# Patient Record
Sex: FEMALE | NOT HISPANIC OR LATINO | Employment: FULL TIME | URBAN - METROPOLITAN AREA
[De-identification: names, ages, dates, MRNs, and addresses within clinical notes are randomized per-mention and may not be internally consistent; named-entity substitution may affect disease eponyms.]

---

## 2021-04-02 ENCOUNTER — BULK ORDERING (OUTPATIENT)
Dept: CASE MANAGEMENT | Facility: OTHER | Age: 46
End: 2021-04-02

## 2021-04-02 DIAGNOSIS — Z23 IMMUNIZATION DUE: ICD-10-CM

## 2021-07-22 ENCOUNTER — LAB (OUTPATIENT)
Dept: LAB | Facility: HOSPITAL | Age: 46
End: 2021-07-22

## 2021-07-22 ENCOUNTER — OFFICE VISIT (OUTPATIENT)
Dept: INTERNAL MEDICINE | Facility: CLINIC | Age: 46
End: 2021-07-22

## 2021-07-22 VITALS
TEMPERATURE: 97.1 F | WEIGHT: 250 LBS | DIASTOLIC BLOOD PRESSURE: 88 MMHG | RESPIRATION RATE: 18 BRPM | HEART RATE: 78 BPM | SYSTOLIC BLOOD PRESSURE: 132 MMHG | OXYGEN SATURATION: 98 % | HEIGHT: 63 IN | BODY MASS INDEX: 44.3 KG/M2

## 2021-07-22 DIAGNOSIS — R06.83 SNORING: ICD-10-CM

## 2021-07-22 DIAGNOSIS — Z87.09 HISTORY OF ASTHMA: ICD-10-CM

## 2021-07-22 DIAGNOSIS — E66.01 MORBID (SEVERE) OBESITY DUE TO EXCESS CALORIES (HCC): Primary | ICD-10-CM

## 2021-07-22 DIAGNOSIS — Z11.59 ENCOUNTER FOR HEPATITIS C SCREENING TEST FOR LOW RISK PATIENT: ICD-10-CM

## 2021-07-22 PROCEDURE — 99203 OFFICE O/P NEW LOW 30 MIN: CPT | Performed by: NURSE PRACTITIONER

## 2021-07-22 NOTE — PROGRESS NOTES
Subjective   Chief Complaint   Patient presents with   • Sleep Apnea   • Obesity       Lilia Pritchett is a 46 y.o. female here today as a new patient to establish care.  She complains of obesity.  She states she's been overweight for about 10 years.  She has not had a PCP for about 3 years.  She has tried cutting out sweats, she stopped eating red meat about a month ago.  She does not exercise.   Pt is needing a referral to be tested for sleep apnea.  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  She states that her wife tells her she snores from time to time.    She reports no medical history  She doesn't take any Rx meds.  Only takes Benadryl at night to sleep.    Had cataract surgery left eye earlier this month.    History of asthma.  Has rescue inhaler for prn use.      Review of Systems   Constitutional: Positive for unexpected weight gain. Negative for activity change, appetite change and fatigue.   HENT: Negative for congestion.    Respiratory: Negative for cough and shortness of breath.    Cardiovascular: Negative for chest pain and leg swelling.   Gastrointestinal: Negative for abdominal pain.   Neurological: Negative for dizziness, weakness and confusion.   Psychiatric/Behavioral: Negative for behavioral problems and decreased concentration.       Past Medical History:   Diagnosis Date   • Asthma    • Migraine      Past Surgical History:   Procedure Laterality Date   • SPIDER BITE EXCISION      right calf     Family History   Problem Relation Age of Onset   • Cancer Mother    • Hypertension Maternal Aunt    • Hypertension Maternal Grandmother    • Stroke Paternal Grandfather      Social History     Tobacco Use   Smoking Status Never Smoker   Smokeless Tobacco Never Used      Social History     Substance and Sexual Activity   Alcohol Use Yes    Comment: 1-2      No current outpatient medications on file prior to visit.     No current facility-administered medications on file prior to visit.     Allergies  "  Allergen Reactions   • Latex Other (See Comments)   • Other Other (See Comments)   • Peanut Oil Other (See Comments)       Objective   Vitals:    07/22/21 1357   BP: 132/88   Pulse: 78   Resp: 18   Temp: 97.1 °F (36.2 °C)   TempSrc: Temporal   SpO2: 98%   Weight: 113 kg (250 lb)   Height: 160 cm (63\")     Body mass index is 44.29 kg/m².    Physical Exam  Vitals and nursing note reviewed.   Constitutional:       Appearance: She is obese.   HENT:      Head: Normocephalic.   Eyes:      Pupils: Pupils are equal, round, and reactive to light.   Neck:      Thyroid: No thyroid mass, thyromegaly or thyroid tenderness.   Cardiovascular:      Rate and Rhythm: Normal rate and regular rhythm.      Pulses: Normal pulses.      Heart sounds: Normal heart sounds.   Pulmonary:      Effort: Pulmonary effort is normal.      Breath sounds: Normal breath sounds.   Skin:     General: Skin is warm and dry.      Capillary Refill: Capillary refill takes less than 2 seconds.   Neurological:      General: No focal deficit present.      Mental Status: She is alert and oriented to person, place, and time.      Gait: Gait is intact.   Psychiatric:         Attention and Perception: Attention normal.         Mood and Affect: Mood normal.         Behavior: Behavior normal.         Assessment/Plan   Problem List Items Addressed This Visit     None      Visit Diagnoses     Morbid (severe) obesity due to excess calories (CMS/HCC)    -  Primary    Relevant Orders    Ambulatory Referral to Sleep Medicine    Comprehensive Metabolic Panel    CBC Auto Differential    Hemoglobin A1c    Lipid Panel    TSH Rfx On Abnormal To Free T4    Snoring        Encounter for hepatitis C screening test for low risk patient        Relevant Orders    Hepatitis C Antibody    History of asthma             Return for fasting labs  Schedule sleep study  Schedule physical    No current outpatient medications on file.        Plan of care reviewed with the patient at the " conclusion of today's visit.  Education was provided regarding diagnosis, management, and any prescribed or recommended OTC medications.  Patient verbalized understanding of and agreement with management plan.     Return for Annual.      MARIA ELENA Chavez

## 2021-07-28 ENCOUNTER — LAB (OUTPATIENT)
Dept: LAB | Facility: HOSPITAL | Age: 46
End: 2021-07-28

## 2021-07-28 DIAGNOSIS — E66.01 MORBID (SEVERE) OBESITY DUE TO EXCESS CALORIES (HCC): ICD-10-CM

## 2021-07-28 LAB
ALBUMIN SERPL-MCNC: 3.9 G/DL (ref 3.5–5.2)
ALBUMIN/GLOB SERPL: 1.2 G/DL
ALP SERPL-CCNC: 73 U/L (ref 39–117)
ALT SERPL W P-5'-P-CCNC: 11 U/L (ref 1–33)
ANION GAP SERPL CALCULATED.3IONS-SCNC: 9.8 MMOL/L (ref 5–15)
AST SERPL-CCNC: 16 U/L (ref 1–32)
BASOPHILS # BLD AUTO: 0.04 10*3/MM3 (ref 0–0.2)
BASOPHILS NFR BLD AUTO: 0.7 % (ref 0–1.5)
BILIRUB SERPL-MCNC: 0.6 MG/DL (ref 0–1.2)
BUN SERPL-MCNC: 7 MG/DL (ref 6–20)
BUN/CREAT SERPL: 9.1 (ref 7–25)
CALCIUM SPEC-SCNC: 8.6 MG/DL (ref 8.6–10.5)
CHLORIDE SERPL-SCNC: 104 MMOL/L (ref 98–107)
CHOLEST SERPL-MCNC: 176 MG/DL (ref 0–200)
CO2 SERPL-SCNC: 22.2 MMOL/L (ref 22–29)
CREAT SERPL-MCNC: 0.77 MG/DL (ref 0.57–1)
DEPRECATED RDW RBC AUTO: 38.5 FL (ref 37–54)
EOSINOPHIL # BLD AUTO: 0.15 10*3/MM3 (ref 0–0.4)
EOSINOPHIL NFR BLD AUTO: 2.6 % (ref 0.3–6.2)
ERYTHROCYTE [DISTWIDTH] IN BLOOD BY AUTOMATED COUNT: 13.4 % (ref 12.3–15.4)
GFR SERPL CREATININE-BSD FRML MDRD: 98 ML/MIN/1.73
GLOBULIN UR ELPH-MCNC: 3.2 GM/DL
GLUCOSE SERPL-MCNC: 61 MG/DL (ref 65–99)
HBA1C MFR BLD: 5.2 % (ref 4.8–5.6)
HCT VFR BLD AUTO: 35.6 % (ref 34–46.6)
HCV AB SER DONR QL: NORMAL
HDLC SERPL-MCNC: 66 MG/DL (ref 40–60)
HGB BLD-MCNC: 11.4 G/DL (ref 12–15.9)
IMM GRANULOCYTES # BLD AUTO: 0.01 10*3/MM3 (ref 0–0.05)
IMM GRANULOCYTES NFR BLD AUTO: 0.2 % (ref 0–0.5)
LDLC SERPL CALC-MCNC: 102 MG/DL (ref 0–100)
LDLC/HDLC SERPL: 1.56 {RATIO}
LYMPHOCYTES # BLD AUTO: 2.29 10*3/MM3 (ref 0.7–3.1)
LYMPHOCYTES NFR BLD AUTO: 40 % (ref 19.6–45.3)
MCH RBC QN AUTO: 25.6 PG (ref 26.6–33)
MCHC RBC AUTO-ENTMCNC: 32 G/DL (ref 31.5–35.7)
MCV RBC AUTO: 79.8 FL (ref 79–97)
MONOCYTES # BLD AUTO: 0.5 10*3/MM3 (ref 0.1–0.9)
MONOCYTES NFR BLD AUTO: 8.7 % (ref 5–12)
NEUTROPHILS NFR BLD AUTO: 2.73 10*3/MM3 (ref 1.7–7)
NEUTROPHILS NFR BLD AUTO: 47.8 % (ref 42.7–76)
NRBC BLD AUTO-RTO: 0 /100 WBC (ref 0–0.2)
PLATELET # BLD AUTO: 317 10*3/MM3 (ref 140–450)
PMV BLD AUTO: 10.1 FL (ref 6–12)
POTASSIUM SERPL-SCNC: 3.2 MMOL/L (ref 3.5–5.2)
PROT SERPL-MCNC: 7.1 G/DL (ref 6–8.5)
RBC # BLD AUTO: 4.46 10*6/MM3 (ref 3.77–5.28)
SODIUM SERPL-SCNC: 136 MMOL/L (ref 136–145)
TRIGL SERPL-MCNC: 35 MG/DL (ref 0–150)
TSH SERPL DL<=0.05 MIU/L-ACNC: 0.86 UIU/ML (ref 0.27–4.2)
VLDLC SERPL-MCNC: 8 MG/DL (ref 5–40)
WBC # BLD AUTO: 5.72 10*3/MM3 (ref 3.4–10.8)

## 2021-07-28 PROCEDURE — 80053 COMPREHEN METABOLIC PANEL: CPT | Performed by: NURSE PRACTITIONER

## 2021-07-28 PROCEDURE — 83540 ASSAY OF IRON: CPT | Performed by: NURSE PRACTITIONER

## 2021-07-28 PROCEDURE — 84443 ASSAY THYROID STIM HORMONE: CPT | Performed by: NURSE PRACTITIONER

## 2021-07-28 PROCEDURE — 83036 HEMOGLOBIN GLYCOSYLATED A1C: CPT | Performed by: NURSE PRACTITIONER

## 2021-07-28 PROCEDURE — 85025 COMPLETE CBC W/AUTO DIFF WBC: CPT

## 2021-07-28 PROCEDURE — 80061 LIPID PANEL: CPT | Performed by: NURSE PRACTITIONER

## 2021-07-28 PROCEDURE — 86803 HEPATITIS C AB TEST: CPT | Performed by: NURSE PRACTITIONER

## 2021-07-28 PROCEDURE — 84466 ASSAY OF TRANSFERRIN: CPT | Performed by: NURSE PRACTITIONER

## 2021-07-30 DIAGNOSIS — D64.9 ANEMIA, UNSPECIFIED TYPE: Primary | ICD-10-CM

## 2021-07-30 LAB
IRON 24H UR-MRATE: 70 MCG/DL (ref 37–145)
IRON SATN MFR SERPL: 23 % (ref 20–50)
TIBC SERPL-MCNC: 305 MCG/DL (ref 298–536)
TRANSFERRIN SERPL-MCNC: 205 MG/DL (ref 200–360)

## 2021-08-02 DIAGNOSIS — E87.6 HYPOKALEMIA: Primary | ICD-10-CM

## 2021-08-02 RX ORDER — POTASSIUM CHLORIDE 750 MG/1
10 TABLET, FILM COATED, EXTENDED RELEASE ORAL 2 TIMES DAILY
Qty: 10 TABLET | Refills: 0 | Status: SHIPPED | OUTPATIENT
Start: 2021-08-02 | End: 2021-08-07

## 2021-08-19 ENCOUNTER — OFFICE VISIT (OUTPATIENT)
Dept: SLEEP MEDICINE | Facility: HOSPITAL | Age: 46
End: 2021-08-19

## 2021-08-19 ENCOUNTER — OFFICE VISIT (OUTPATIENT)
Dept: INTERNAL MEDICINE | Facility: CLINIC | Age: 46
End: 2021-08-19

## 2021-08-19 VITALS
DIASTOLIC BLOOD PRESSURE: 78 MMHG | HEIGHT: 63 IN | BODY MASS INDEX: 43.41 KG/M2 | WEIGHT: 245 LBS | SYSTOLIC BLOOD PRESSURE: 135 MMHG | OXYGEN SATURATION: 98 % | TEMPERATURE: 97.3 F | HEART RATE: 69 BPM

## 2021-08-19 VITALS
WEIGHT: 241 LBS | SYSTOLIC BLOOD PRESSURE: 130 MMHG | OXYGEN SATURATION: 97 % | DIASTOLIC BLOOD PRESSURE: 78 MMHG | HEIGHT: 63 IN | HEART RATE: 60 BPM | BODY MASS INDEX: 42.7 KG/M2 | TEMPERATURE: 97.5 F

## 2021-08-19 DIAGNOSIS — Z00.00 ROUTINE PHYSICAL EXAMINATION: Primary | ICD-10-CM

## 2021-08-19 DIAGNOSIS — E87.6 HYPOKALEMIA: ICD-10-CM

## 2021-08-19 DIAGNOSIS — Z12.31 ENCOUNTER FOR SCREENING MAMMOGRAM FOR MALIGNANT NEOPLASM OF BREAST: ICD-10-CM

## 2021-08-19 DIAGNOSIS — E66.01 MORBID (SEVERE) OBESITY DUE TO EXCESS CALORIES (HCC): ICD-10-CM

## 2021-08-19 DIAGNOSIS — G47.33 OBSTRUCTIVE SLEEP APNEA, ADULT: ICD-10-CM

## 2021-08-19 DIAGNOSIS — R06.83 SNORING: Primary | ICD-10-CM

## 2021-08-19 DIAGNOSIS — Z12.11 COLON CANCER SCREENING: ICD-10-CM

## 2021-08-19 PROCEDURE — 99396 PREV VISIT EST AGE 40-64: CPT | Performed by: NURSE PRACTITIONER

## 2021-08-19 PROCEDURE — 99203 OFFICE O/P NEW LOW 30 MIN: CPT | Performed by: INTERNAL MEDICINE

## 2021-08-19 NOTE — PROGRESS NOTES
Subjective   Chief Complaint   Patient presents with   • Annual Exam       Lilia Pritchett is a 46 y.o. female here today for annual exam.    Overall healthy: No  Regular exercise:  No  Diet is well balanced:  No  Vitamin Supplement:  No  Alcohol intake:  Yes, 2-3 shots on the weekend   Tobacco use:  No    Cardiovascular risk is low:  Yes   Menstrual cycle regular:  Yes  PAP:  2 years ago  Concern for STDs:  No  Mammogram:  Age 39  Last colon screening:  Never - will schedule  Regular dental exam:  Yes, upper dentures  Regular eye exam:  Yes, recently had cataract surgery  Immunizations up to date:  Yes  Wear a seatbelt regularly:  Yes  Wear sunscreen regularly when outdoors:  Yes    Review of Systems   Constitutional: Negative for activity change, appetite change and fatigue.   HENT: Negative for congestion.    Respiratory: Negative for cough and shortness of breath.    Cardiovascular: Negative for chest pain and leg swelling.   Gastrointestinal: Negative for abdominal pain.   Musculoskeletal: Negative for arthralgias.   Skin: Negative for rash.   Neurological: Negative for dizziness, weakness and confusion.   Psychiatric/Behavioral: Negative for behavioral problems and decreased concentration.       The following portions of the patient's history were reviewed and updated as appropriate: allergies, current medications, past family history, past medical history, past social history, past surgical history and problem list.    Past Medical History:   Diagnosis Date   • Asthma    • Migraine      Past Surgical History:   Procedure Laterality Date   • SPIDER BITE EXCISION      right calf     Family History   Problem Relation Age of Onset   • Cancer Mother    • Hypertension Maternal Aunt    • Hypertension Maternal Grandmother    • Stroke Paternal Grandfather      Social History     Tobacco Use   Smoking Status Never Smoker   Smokeless Tobacco Never Used      Social History     Substance and Sexual Activity   Alcohol Use  "Yes   • Alcohol/week: 3.0 standard drinks   • Types: 1 Glasses of wine, 2 Standard drinks or equivalent per week    Comment: 1-2      Allergies   Allergen Reactions   • Bactrim [Sulfamethoxazole-Trimethoprim] Swelling   • Latex Other (See Comments)   • Other Other (See Comments)   • Peanut Oil Other (See Comments)       No current outpatient medications on file prior to visit.     No current facility-administered medications on file prior to visit.       Objective   Vitals:    08/19/21 1444   BP: 135/78   Pulse: 69   Temp: 97.3 °F (36.3 °C)   TempSrc: Temporal   SpO2: 98%   Weight: 111 kg (245 lb)   Height: 160 cm (63\")     Body mass index is 43.4 kg/m².    Physical Exam  Vitals and nursing note reviewed.   Constitutional:       Appearance: She is morbidly obese.   HENT:      Head: Normocephalic.   Eyes:      Pupils: Pupils are equal, round, and reactive to light.   Neck:      Thyroid: No thyroid mass, thyromegaly or thyroid tenderness.   Cardiovascular:      Rate and Rhythm: Normal rate and regular rhythm.      Pulses: Normal pulses.           Carotid pulses are 2+ on the right side and 2+ on the left side.       Radial pulses are 2+ on the right side and 2+ on the left side.        Dorsalis pedis pulses are 2+ on the right side and 2+ on the left side.      Heart sounds: Normal heart sounds. No murmur heard.        Comments: Trace of edema BLE  Pulmonary:      Effort: Pulmonary effort is normal.      Breath sounds: Normal breath sounds.   Abdominal:      General: Bowel sounds are normal. There is no distension.      Palpations: Abdomen is soft.      Tenderness: There is no abdominal tenderness. There is no right CVA tenderness or left CVA tenderness.   Musculoskeletal:      Cervical back: Normal.      Thoracic back: Normal.      Lumbar back: Normal.      Comments: Full ROM of all major joints   Lymphadenopathy:      Cervical: No cervical adenopathy.   Skin:     General: Skin is warm and dry.      Capillary Refill: " Capillary refill takes less than 2 seconds.      Findings: No rash.   Neurological:      General: No focal deficit present.      Mental Status: She is alert and oriented to person, place, and time.      Cranial Nerves: Cranial nerves are intact.      Coordination: Coordination is intact.      Gait: Gait is intact.      Deep Tendon Reflexes:      Reflex Scores:       Patellar reflexes are 2+ on the right side and 2+ on the left side.  Psychiatric:         Attention and Perception: Attention normal.         Mood and Affect: Mood normal.         Speech: Speech normal.         Behavior: Behavior normal.         Thought Content: Thought content normal.         Cognition and Memory: Cognition normal.         Judgment: Judgment normal.         Patient's Body mass index is 43.4 kg/m². indicating that she is morbidly obese (BMI > 40 or > 35 with obesity - related health condition). Obesity-related health conditions include the following: none. Obesity is unchanged. BMI is is above average; BMI management plan is completed. We discussed low calorie, low carb based diet program, portion control and increasing exercise..     Assessment/Plan   No current outpatient medications on file.    Orders Only on 07/30/2021   Component Date Value Ref Range Status   • Iron 07/28/2021 70  37 - 145 mcg/dL Final   • Iron Saturation 07/28/2021 23  20 - 50 % Final   • Transferrin 07/28/2021 205  200 - 360 mg/dL Final   • TIBC 07/28/2021 305  298 - 536 mcg/dL Final   Lab on 07/28/2021   Component Date Value Ref Range Status   • WBC 07/28/2021 5.72  3.40 - 10.80 10*3/mm3 Final   • RBC 07/28/2021 4.46  3.77 - 5.28 10*6/mm3 Final   • Hemoglobin 07/28/2021 11.4* 12.0 - 15.9 g/dL Final   • Hematocrit 07/28/2021 35.6  34.0 - 46.6 % Final   • MCV 07/28/2021 79.8  79.0 - 97.0 fL Final   • MCH 07/28/2021 25.6* 26.6 - 33.0 pg Final   • MCHC 07/28/2021 32.0  31.5 - 35.7 g/dL Final   • RDW 07/28/2021 13.4  12.3 - 15.4 % Final   • RDW-SD 07/28/2021 38.5   37.0 - 54.0 fl Final   • MPV 07/28/2021 10.1  6.0 - 12.0 fL Final   • Platelets 07/28/2021 317  140 - 450 10*3/mm3 Final   • Neutrophil % 07/28/2021 47.8  42.7 - 76.0 % Final   • Lymphocyte % 07/28/2021 40.0  19.6 - 45.3 % Final   • Monocyte % 07/28/2021 8.7  5.0 - 12.0 % Final   • Eosinophil % 07/28/2021 2.6  0.3 - 6.2 % Final   • Basophil % 07/28/2021 0.7  0.0 - 1.5 % Final   • Immature Grans % 07/28/2021 0.2  0.0 - 0.5 % Final   • Neutrophils, Absolute 07/28/2021 2.73  1.70 - 7.00 10*3/mm3 Final   • Lymphocytes, Absolute 07/28/2021 2.29  0.70 - 3.10 10*3/mm3 Final   • Monocytes, Absolute 07/28/2021 0.50  0.10 - 0.90 10*3/mm3 Final   • Eosinophils, Absolute 07/28/2021 0.15  0.00 - 0.40 10*3/mm3 Final   • Basophils, Absolute 07/28/2021 0.04  0.00 - 0.20 10*3/mm3 Final   • Immature Grans, Absolute 07/28/2021 0.01  0.00 - 0.05 10*3/mm3 Final   • nRBC 07/28/2021 0.0  0.0 - 0.2 /100 WBC Final   Office Visit on 07/22/2021   Component Date Value Ref Range Status   • Glucose 07/28/2021 61* 65 - 99 mg/dL Final   • BUN 07/28/2021 7  6 - 20 mg/dL Final   • Creatinine 07/28/2021 0.77  0.57 - 1.00 mg/dL Final   • Sodium 07/28/2021 136  136 - 145 mmol/L Final   • Potassium 07/28/2021 3.2* 3.5 - 5.2 mmol/L Final   • Chloride 07/28/2021 104  98 - 107 mmol/L Final   • CO2 07/28/2021 22.2  22.0 - 29.0 mmol/L Final   • Calcium 07/28/2021 8.6  8.6 - 10.5 mg/dL Final   • Total Protein 07/28/2021 7.1  6.0 - 8.5 g/dL Final   • Albumin 07/28/2021 3.90  3.50 - 5.20 g/dL Final   • ALT (SGPT) 07/28/2021 11  1 - 33 U/L Final   • AST (SGOT) 07/28/2021 16  1 - 32 U/L Final   • Alkaline Phosphatase 07/28/2021 73  39 - 117 U/L Final   • Total Bilirubin 07/28/2021 0.6  0.0 - 1.2 mg/dL Final   • eGFR   Amer 07/28/2021 98  >60 mL/min/1.73 Final   • Globulin 07/28/2021 3.2  gm/dL Final   • A/G Ratio 07/28/2021 1.2  g/dL Final   • BUN/Creatinine Ratio 07/28/2021 9.1  7.0 - 25.0 Final   • Anion Gap 07/28/2021 9.8  5.0 - 15.0 mmol/L Final   •  Hemoglobin A1C 07/28/2021 5.20  4.80 - 5.60 % Final   • Total Cholesterol 07/28/2021 176  0 - 200 mg/dL Final   • Triglycerides 07/28/2021 35  0 - 150 mg/dL Final   • HDL Cholesterol 07/28/2021 66* 40 - 60 mg/dL Final   • LDL Cholesterol  07/28/2021 102* 0 - 100 mg/dL Final   • VLDL Cholesterol 07/28/2021 8  5 - 40 mg/dL Final   • LDL/HDL Ratio 07/28/2021 1.56   Final   • TSH 07/28/2021 0.861  0.270 - 4.200 uIU/mL Final   • Hepatitis C Ab 07/28/2021 Non-Reactive  Non-Reactive Final   ]    Problem List Items Addressed This Visit     None      Visit Diagnoses     Routine physical examination    -  Primary    Encounter for screening mammogram for malignant neoplasm of breast        Relevant Orders    Mammo Screening Digital Tomosynthesis Bilateral With CAD    Colon cancer screening        Relevant Orders    Ambulatory Referral For Screening Colonoscopy    Hypokalemia        Relevant Orders    Basic metabolic panel        Schedule mammo  Pap due next year  Schedule colonscopy  Recently had consult for sleep study  Discussed weight loss and diet  Labs discussed and reviewed with pt  Recheck K+ in 1-2 weeks       Counseling was given to patient for the following topics: appropriate exercise, healthy eating habits, disease prevention, risk factors for cancer and importance of self breast exam and breast health.      Plan of care reviewed with the patient at the conclusion of today's visit.  Education was provided regarding diagnosis, management, and any prescribed or recommended OTC medications.  Patient verbalized understanding of and agreement with management plan.     Return in about 6 months (around 2/19/2022) for Recheck.      MARIA ELENA Chavez

## 2021-08-22 NOTE — PROGRESS NOTES
Subjective   Lilia Pritchett is a 46 y.o. female is being seen for consultation today at the request of MARIA ELENA Chavez for the evaluation of snoring and possible sleep disordered breathing.  She is seen in person in the sleep clinic.    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  Patient has had snoring noted for 2 years.  She is noted recently to have some apneas.  She does not always feel rested in the morning.  She occasionally awakens gasping for breath.  She denies having morning headache.    She denies awakening with a dry mouth.  She has broken her nose but denies having trouble breathing through her nose.  She denies falling asleep if sitting quietly.  She denies any problems driving.  She occasionally kicks her legs at night but does not keep her awake.  She denies having chronic pain.    She goes to bed between 8 and 9 PM.  She will fall asleep in 30 to 40 minutes.  She awakens once or twice during the night.  She thinks she gets 6 to 7 hours of sleep but is not rested.  She denies history of hypertension, diabetes, or coronary artery disease.  Allergies   Allergen Reactions   • Bactrim [Sulfamethoxazole-Trimethoprim] Swelling   • Latex Other (See Comments)   • Other Other (See Comments)   • Peanut Oil Other (See Comments)        No current outpatient medications on file.    Social History    Tobacco Use      Smoking status: Never Smoker      Smokeless tobacco: Never Used       Social History     Substance and Sexual Activity   Alcohol Use Yes she estimates having 3-4 drinks 2-4 times per month   • Alcohol/week: 3.0 standard drinks   • Types: 1 Glasses of wine, 2 Standard drinks or equivalent per week    Comment: 1-2       Caffeine: She says she has only 1-2 santhosh per week.    Past Medical History:   Diagnosis Date   • Asthma    • Migraine        Past Surgical History:   Procedure Laterality  "Date   • SPIDER BITE EXCISION      right calf       Family History   Problem Relation Age of Onset   • Cancer Mother    • Hypertension Maternal Aunt    • Hypertension Maternal Grandmother    • Stroke Paternal Grandfather    Family history is positive for asthma    The following portions of the patient's history were reviewed and updated as appropriate: allergies, current medications, past family history, past medical history, past social history, past surgical history and problem list.    Review of Systems   Constitutional: Positive for appetite change.   HENT: Positive for sinus pain.    Eyes: Positive for visual disturbance.   Respiratory: Negative.    Cardiovascular: Positive for leg swelling.   Gastrointestinal: Negative.    Endocrine: Negative.    Genitourinary: Negative.    Musculoskeletal: Positive for joint swelling.   Skin: Negative.    Allergic/Immunologic: Negative.    Neurological: Positive for headaches.   Psychiatric/Behavioral: Positive for sleep disturbance.   Mifflin score is 7/24    Objective     /78   Pulse 60   Temp 97.5 °F (36.4 °C) (Temporal)   Ht 160 cm (63\")   Wt 109 kg (241 lb)   SpO2 97%   BMI 42.69 kg/m²      Physical Exam  Patient appears to be awake and alert.  She does not appear to be in acute respiratory distress.    She is normocephalic.  She has Mallampati class IV anatomy.  She apparently has dentures.    Lungs are clear.    Cardiac exam revealed normal S1-S2.    Extremities showed no edema.    Assessment/Plan   Diagnoses and all orders for this visit:    1. Snoring (Primary)  -     Home Sleep Study; Future    2. Obstructive sleep apnea, adult  -     Home Sleep Study; Future    3. Morbid (severe) obesity due to excess calories (CMS/HCC)    Patient has a history of snoring and noted to have occasional apneas.  She gives an excellent story for obstructive sleep apnea.  We will plan to proceed to home sleep testing with a device that has a chain of custody.  We have " discussed potential therapies including CPAP, weight control, oral appliance, and surgery.  She is aware of a CPAP download showing adherence to her device would help with clearance for her DOT exam if diagnosis of obstructive sleep apnea is made.  We have discussed the long-term consequences of untreated obstructive sleep apnea.  These include hypertension, diabetes, heart disease, stroke, and dementia.  She is encouraged to lose weight.  She says she is talking to her primary care provider about that.  She is encouraged to avoid alcohol and sedatives close to bedtime.  She is encouraged to practice lateral position sleep.    Total time: 35 minutes exclusive of procedures.         Toñito Bailon MD [facility]  Sleep Medicine  Pulmonary and Critical Care Medicine

## 2021-09-09 DIAGNOSIS — Z12.11 ENCOUNTER FOR SCREENING COLONOSCOPY: Primary | ICD-10-CM

## 2021-09-24 ENCOUNTER — APPOINTMENT (OUTPATIENT)
Dept: OTHER | Facility: HOSPITAL | Age: 46
End: 2021-09-24

## 2021-09-24 ENCOUNTER — HOSPITAL ENCOUNTER (OUTPATIENT)
Dept: MAMMOGRAPHY | Facility: HOSPITAL | Age: 46
Discharge: HOME | End: 2021-09-24
Admitting: NURSE PRACTITIONER

## 2021-09-24 DIAGNOSIS — Z12.31 ENCOUNTER FOR SCREENING MAMMOGRAM FOR MALIGNANT NEOPLASM OF BREAST: ICD-10-CM

## 2021-09-24 DIAGNOSIS — Z92.89 HISTORY OF MAMMOGRAM: ICD-10-CM

## 2021-09-24 PROCEDURE — 77063 BREAST TOMOSYNTHESIS BI: CPT | Performed by: RADIOLOGY

## 2021-09-24 PROCEDURE — 77063 BREAST TOMOSYNTHESIS BI: CPT

## 2021-09-24 PROCEDURE — 77067 SCR MAMMO BI INCL CAD: CPT

## 2021-09-24 PROCEDURE — 77067 SCR MAMMO BI INCL CAD: CPT | Performed by: RADIOLOGY

## 2021-09-27 ENCOUNTER — OUTSIDE FACILITY SERVICE (OUTPATIENT)
Dept: GASTROENTEROLOGY | Facility: CLINIC | Age: 46
End: 2021-09-27

## 2021-09-27 PROCEDURE — 45378 DIAGNOSTIC COLONOSCOPY: CPT | Performed by: INTERNAL MEDICINE

## 2021-10-01 ENCOUNTER — LAB (OUTPATIENT)
Dept: LAB | Facility: HOSPITAL | Age: 46
End: 2021-10-01

## 2021-10-01 ENCOUNTER — OFFICE VISIT (OUTPATIENT)
Dept: INTERNAL MEDICINE | Facility: CLINIC | Age: 46
End: 2021-10-01

## 2021-10-01 VITALS
DIASTOLIC BLOOD PRESSURE: 88 MMHG | TEMPERATURE: 97.3 F | BODY MASS INDEX: 43.3 KG/M2 | HEART RATE: 78 BPM | SYSTOLIC BLOOD PRESSURE: 134 MMHG | WEIGHT: 244.4 LBS | OXYGEN SATURATION: 98 % | HEIGHT: 63 IN

## 2021-10-01 DIAGNOSIS — M25.511 CHRONIC RIGHT SHOULDER PAIN: Primary | ICD-10-CM

## 2021-10-01 DIAGNOSIS — E87.6 HYPOKALEMIA: ICD-10-CM

## 2021-10-01 DIAGNOSIS — J01.80 ACUTE NON-RECURRENT SINUSITIS OF OTHER SINUS: ICD-10-CM

## 2021-10-01 DIAGNOSIS — G89.29 CHRONIC RIGHT SHOULDER PAIN: Primary | ICD-10-CM

## 2021-10-01 LAB
ANION GAP SERPL CALCULATED.3IONS-SCNC: 8.7 MMOL/L (ref 5–15)
BUN SERPL-MCNC: 12 MG/DL (ref 6–20)
BUN/CREAT SERPL: 16.7 (ref 7–25)
CALCIUM SPEC-SCNC: 8.7 MG/DL (ref 8.6–10.5)
CHLORIDE SERPL-SCNC: 104 MMOL/L (ref 98–107)
CO2 SERPL-SCNC: 26.3 MMOL/L (ref 22–29)
CREAT SERPL-MCNC: 0.72 MG/DL (ref 0.57–1)
GFR SERPL CREATININE-BSD FRML MDRD: 106 ML/MIN/1.73
GLUCOSE SERPL-MCNC: 92 MG/DL (ref 65–99)
POTASSIUM SERPL-SCNC: 3.7 MMOL/L (ref 3.5–5.2)
SODIUM SERPL-SCNC: 139 MMOL/L (ref 136–145)

## 2021-10-01 PROCEDURE — 99213 OFFICE O/P EST LOW 20 MIN: CPT | Performed by: NURSE PRACTITIONER

## 2021-10-01 PROCEDURE — 36415 COLL VENOUS BLD VENIPUNCTURE: CPT

## 2021-10-01 PROCEDURE — 80048 BASIC METABOLIC PNL TOTAL CA: CPT

## 2021-10-01 RX ORDER — AMOXICILLIN 875 MG/1
875 TABLET, COATED ORAL EVERY 12 HOURS SCHEDULED
Qty: 20 TABLET | Refills: 0 | Status: SHIPPED | OUTPATIENT
Start: 2021-10-01 | End: 2021-10-11

## 2021-10-01 RX ORDER — FLUCONAZOLE 150 MG/1
150 TABLET ORAL ONCE
Qty: 1 TABLET | Refills: 0 | Status: SHIPPED | OUTPATIENT
Start: 2021-10-01 | End: 2021-10-01

## 2021-10-01 NOTE — PROGRESS NOTES
"Chief Complaint   Patient presents with   • Shoulder Pain     right, x 1 months, on and off 2 years    • Sinus Problem     x 1 day        HPI  Lilia Pritchett is a 46 y.o. female presents for right shoulder pain.  The pain started consistently about a month now, but has been hurting intermittently for about 2 years.  She has never had an XR or seen ortho.  Denies any trauma.  Sometimes she has a hard time raising the shoulder up.  Has tried Ibuprofen without any relief.  Anterior pain when she tries to put her arm behind her back.    Pt also complains of sinus pressure.   Feels stuffy and tight in her face.  Took Sudafed last night without any relief.  Started a couple days ago.  No congestion, just sinus pressure.     The following portions of the patient's history were reviewed and updated as appropriate: allergies, current medications, past family history, past medical history, past social history, past surgical history and problem list.    Subjective  Review of Systems   Constitutional: Negative for activity change, appetite change and fatigue.   HENT: Positive for congestion and sinus pressure. Negative for ear pain, postnasal drip and sore throat.    Respiratory: Negative for cough and shortness of breath.    Cardiovascular: Negative for chest pain and leg swelling.   Gastrointestinal: Negative for abdominal pain.   Musculoskeletal: Positive for arthralgias (right shoulder).   Neurological: Positive for headache. Negative for dizziness, weakness and confusion.   Psychiatric/Behavioral: Negative for behavioral problems and decreased concentration.       Objective  Visit Vitals  /88   Pulse 78   Temp 97.3 °F (36.3 °C) (Temporal)   Ht 160 cm (63\")   Wt 111 kg (244 lb 6.4 oz)   LMP 09/23/2021 Comment: denies pregnancy   SpO2 98%   BMI 43.29 kg/m²        Physical Exam  Vitals and nursing note reviewed.   Constitutional:       Appearance: Normal appearance.   HENT:      Head: Normocephalic and atraumatic.      " Nose: Mucosal edema present.      Right Sinus: Maxillary sinus tenderness present.      Left Sinus: Maxillary sinus tenderness present.      Mouth/Throat:      Mouth: Mucous membranes are moist.      Comments: +PND  Eyes:      Pupils: Pupils are equal, round, and reactive to light.   Cardiovascular:      Rate and Rhythm: Normal rate and regular rhythm.   Pulmonary:      Effort: Pulmonary effort is normal.      Breath sounds: Normal breath sounds.   Musculoskeletal:      Right shoulder: No swelling, deformity or tenderness. Decreased range of motion.   Skin:     General: Skin is warm and dry.      Capillary Refill: Capillary refill takes less than 2 seconds.   Neurological:      Mental Status: She is alert and oriented to person, place, and time. Mental status is at baseline.   Psychiatric:         Mood and Affect: Mood normal.          Procedures      Assessment and Plan  Diagnoses and all orders for this visit:    1. Chronic right shoulder pain (Primary)  -     MRI Shoulder Right Without Contrast; Future    2. Acute non-recurrent sinusitis of other sinus  -     amoxicillin (AMOXIL) 875 MG tablet; Take 1 tablet by mouth Every 12 (Twelve) Hours for 10 days.  Dispense: 20 tablet; Refill: 0  -     fluconazole (Diflucan) 150 MG tablet; Take 1 tablet by mouth 1 (One) Time for 1 dose.  Dispense: 1 tablet; Refill: 0    Shoulder pain is chronic - schedule MRI  Amoxicillin for sinuses  Diflucan for yeast preventive    Return if symptoms worsen or fail to improve.        MARIA ELENA Chavez

## 2021-10-20 ENCOUNTER — OFFICE VISIT (OUTPATIENT)
Dept: INTERNAL MEDICINE | Facility: CLINIC | Age: 46
End: 2021-10-20

## 2021-10-20 VITALS
OXYGEN SATURATION: 98 % | SYSTOLIC BLOOD PRESSURE: 140 MMHG | HEIGHT: 63 IN | DIASTOLIC BLOOD PRESSURE: 80 MMHG | BODY MASS INDEX: 43.48 KG/M2 | HEART RATE: 78 BPM | TEMPERATURE: 97.3 F | WEIGHT: 245.4 LBS
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
## 2021-10-20 DIAGNOSIS — H60.11 CELLULITIS OF RIGHT EAR: Primary | ICD-10-CM

## 2021-10-20 PROCEDURE — 99213 OFFICE O/P EST LOW 20 MIN: CPT | Performed by: NURSE PRACTITIONER

## 2021-10-20 RX ORDER — AMOXICILLIN AND CLAVULANATE POTASSIUM 875; 125 MG/1; MG/1
1 TABLET, FILM COATED ORAL 2 TIMES DAILY
Qty: 20 TABLET | Refills: 0 | Status: SHIPPED | OUTPATIENT
Start: 2021-10-20 | End: 2021-10-30
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
## 2021-10-25 ENCOUNTER — HOSPITAL ENCOUNTER (OUTPATIENT)
Dept: SLEEP MEDICINE | Facility: HOSPITAL | Age: 46
Discharge: HOME | End: 2021-10-25
Admitting: INTERNAL MEDICINE

## 2021-10-25 VITALS — HEIGHT: 63 IN | WEIGHT: 244.71 LBS | BODY MASS INDEX: 43.36 KG/M2

## 2021-10-25 DIAGNOSIS — R06.83 SNORING: ICD-10-CM

## 2021-10-25 DIAGNOSIS — G47.33 OBSTRUCTIVE SLEEP APNEA, ADULT: ICD-10-CM

## 2021-10-25 PROCEDURE — 95800 SLP STDY UNATTENDED: CPT

## 2021-10-25 PROCEDURE — 95800 SLP STDY UNATTENDED: CPT | Performed by: INTERNAL MEDICINE

## 2021-10-27 ENCOUNTER — HOSPITAL ENCOUNTER (OUTPATIENT)
Dept: MAMMOGRAPHY | Facility: HOSPITAL | Age: 46
Discharge: HOME | End: 2021-10-27
Admitting: RADIOLOGY

## 2021-10-27 DIAGNOSIS — G47.33 OBSTRUCTIVE SLEEP APNEA, ADULT: Primary | ICD-10-CM

## 2021-10-27 DIAGNOSIS — R92.8 ABNORMAL MAMMOGRAM: ICD-10-CM

## 2021-10-27 PROCEDURE — 77065 DX MAMMO INCL CAD UNI: CPT

## 2021-10-27 PROCEDURE — 77065 DX MAMMO INCL CAD UNI: CPT | Performed by: RADIOLOGY

## 2021-10-27 NOTE — PROGRESS NOTES
CALLED PATIENT AND ADVISED OF STUDY RESULTS. PATIENT VERBALIZED UNDERSTANDING AND WAS AGREEABLE TO PAP THERAPY. FAXED ORDER TO [facility] 10/27/21 ANGEL

## 2021-10-28 ENCOUNTER — LAB (OUTPATIENT)
Dept: LAB | Facility: HOSPITAL | Age: 46
End: 2021-10-28

## 2021-10-28 ENCOUNTER — HOSPITAL ENCOUNTER (OUTPATIENT)
Dept: MRI IMAGING | Facility: HOSPITAL | Age: 46
Discharge: HOME | End: 2021-10-28

## 2021-10-28 DIAGNOSIS — G89.29 CHRONIC RIGHT SHOULDER PAIN: ICD-10-CM
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
## 2021-10-28 DIAGNOSIS — M25.511 CHRONIC RIGHT SHOULDER PAIN: ICD-10-CM

## 2021-10-28 PROCEDURE — 36415 COLL VENOUS BLD VENIPUNCTURE: CPT

## 2021-10-28 PROCEDURE — 73221 MRI JOINT UPR EXTREM W/O DYE: CPT

## 2021-10-28 PROCEDURE — 80074 ACUTE HEPATITIS PANEL: CPT

## 2021-10-28 PROCEDURE — G0432 EIA HIV-1/HIV-2 SCREEN: HCPCS

## 2021-10-29 DIAGNOSIS — R93.6 ABNORMAL MRI, SHOULDER: Primary | ICD-10-CM

## 2021-10-29 LAB
HAV IGM SERPL QL IA: NORMAL
HBV CORE IGM SERPL QL IA: NORMAL
HBV SURFACE AG SERPL QL IA: NORMAL
HCV AB SER DONR QL: NORMAL
HIV1+2 AB SER QL: NORMAL

## 2021-11-10 ENCOUNTER — TELEPHONE (OUTPATIENT)
Dept: ORTHOPEDIC SURGERY | Facility: CLINIC | Age: 46
End: 2021-11-10

## 2021-11-10 NOTE — TELEPHONE ENCOUNTER
Called patient about missed appointment 11/8 LVM remind patient about our 24 hour cancellation notice

## 2021-11-12 ENCOUNTER — OFFICE VISIT (OUTPATIENT)
Dept: ORTHOPEDIC SURGERY | Facility: CLINIC | Age: 46
End: 2021-11-12

## 2021-11-12 VITALS
BODY MASS INDEX: 43.36 KG/M2 | SYSTOLIC BLOOD PRESSURE: 162 MMHG | WEIGHT: 244.71 LBS | HEART RATE: 54 BPM | DIASTOLIC BLOOD PRESSURE: 81 MMHG | HEIGHT: 63 IN

## 2021-11-12 DIAGNOSIS — S43.431A SUPERIOR GLENOID LABRUM LESION OF RIGHT SHOULDER, INITIAL ENCOUNTER: ICD-10-CM

## 2021-11-12 DIAGNOSIS — M25.511 RIGHT SHOULDER PAIN, UNSPECIFIED CHRONICITY: Primary | ICD-10-CM

## 2021-11-12 DIAGNOSIS — M75.91 SUPRASPINATUS TENDINITIS, RIGHT: ICD-10-CM

## 2021-11-12 PROCEDURE — 99204 OFFICE O/P NEW MOD 45 MIN: CPT | Performed by: ORTHOPAEDIC SURGERY

## 2021-11-12 RX ORDER — MELOXICAM 15 MG/1
TABLET ORAL
Qty: 90 TABLET | Refills: 2 | Status: SHIPPED | OUTPATIENT
Start: 2021-11-12 | End: 2022-02-24

## 2021-11-12 NOTE — PROGRESS NOTES
"      [facility] [place] Note    Subjective     CC: Pain of the Right Shoulder      HPI    Lilia Pritchett is a 46 y.o. female who presents with new problem of: right shoulder pain.  Onset: atraumatic and gradual in nature. The issue has been ongoing for 3 year(s). Pain is a 6/10 on the pain scale. Pain is described as aching and shooting. Associated symptoms include pain and stiffness. The pain is worse with working; ice improve the pain. Previous treatments have included: bracing.    I have reviewed the following portions of the patient's history:History of Present Illness and review of systems.    She had an MRI. She works in a warehouse.    Review of Systems   Constitutional: Negative.  Negative for chills, fatigue and fever.   HENT: Negative.  Negative for congestion and dental problem.    Eyes: Negative.  Negative for blurred vision.   Respiratory: Negative.  Negative for shortness of breath.    Cardiovascular: Negative.  Negative for leg swelling.   Gastrointestinal: Negative.  Negative for abdominal pain.   Endocrine: Negative.  Negative for polyuria.   Genitourinary: Negative.  Negative for difficulty urinating.   Musculoskeletal: Positive for arthralgias.   Skin: Negative.    Allergic/Immunologic: Negative.    Neurological: Negative.    Hematological: Negative.  Negative for adenopathy.   Psychiatric/Behavioral: Negative.  Negative for behavioral problems.       ROS:    Constiutional:Pt denies fever, chills, nausea, or vomiting.  MSK:as above      Objective      Past Medical History  Past Medical History:   Diagnosis Date   • Asthma    • Migraine          Physical Exam  /81   Pulse 54   Ht 160 cm (62.99\")   Wt 111 kg (244 lb 11.4 oz)   BMI 43.36 kg/m²     Body mass index is 43.36 kg/m².    Patient is well nourished and well developed.        Ortho Exam  Right shoulder full motion. Positive impingement. Tender proximal biceps tendon. No AC joint tenderness.    Imaging/Labs/EMG " Reviewed:  Imaging Results (Last 24 Hours)     Procedure Component Value Units Date/Time    XR Shoulder 2+ View Right [[id number]] Resulted: 11/12/21 0819     Updated: 11/12/21 0819    Narrative:      Right Shoulder X-Ray  Indication: Pain  AP, scapular Y, and axillary lateral views    Findings:  No fracture  No bony lesion  Normal soft tissues  Normal joint spaces    No prior studies were available for comparison.        Viewed and personally interpreted the MRI from October 28 as rotator cuff tendinopathy of the supraspinatus with superior labral partial tear    Assessment:  1. Right shoulder pain, unspecified chronicity    2. Supraspinatus tendinitis, right    3. Superior glenoid labrum lesion of right shoulder, initial encounter        Plan:  1. Recommend over the counter anti-inflammatories for pain and/or swelling  2. Ordered Mobic to take daily for pain and inflammation  3. [facility] Physical Therapy as that has not been tried yet.  4. If physical therapy does not help consider arthroscopy to address knee SLAP tear  5. She did not want work restrictions.    Follow Up:   Return in about 3 weeks (around 12/3/2021).      Medical Decision Making  Management Options : Moderate - RX Drug management         Lawrence Turner M.D., [facility]  Orthopedic Surgeon  Fellowship Trained Sports Medicine  [facility]  Orthopedics and Sports Medicine  [address], [address]  [place] [address]    EMR Dragon/Transcription disclaimer:  Much of this encounter note is an electronic transcription of spoken language to printed text. Electronic transcription of spoken language may permit erroneous, or at times, nonsensical words or phrases to be inadvertently transcribed. Although I have reviewed the note for such errors, some may still exist.

## 2021-12-08 ENCOUNTER — TELEPHONE (OUTPATIENT)
Dept: ORTHOPEDIC SURGERY | Facility: CLINIC | Age: 46
End: 2021-12-08

## 2021-12-08 NOTE — TELEPHONE ENCOUNTER
Called patient about missed appointment 12/6, patient stated she worked a late shift and woke up late, patient rescheduled

## 2021-12-15 ENCOUNTER — TELEPHONE (OUTPATIENT)
Dept: ORTHOPEDIC SURGERY | Facility: CLINIC | Age: 46
End: 2021-12-15

## 2021-12-15 NOTE — TELEPHONE ENCOUNTER
Caller: PATIENT    Relationship to patient: SELF    Best call back number: [phone]    Patient is needing: PATIENT CALLED TO CANCEL HER FOLLOW UP APPT WITH DR. LEY TODAY DUE TO HER WORK SCHEDULE. PATIENT STATED SHE DOES NOT HAVE HER FIRST PHYSICAL THERAPY APPT UNTIL TOMORROW AFTERNOON AND WANTED TO KNOW WHEN SHE SHOULD RESCHEDULE FOR. PATIENT WOULD LIKE A CALL BACK TO DISCUSS WHEN TO RESCHEDULE SINCE SHE HAS NOT HAD ANY PHYSICAL THERAPY YET. THANK YOU!

## 2021-12-31 ENCOUNTER — APPOINTMENT (OUTPATIENT)
Dept: GENERAL RADIOLOGY | Facility: HOSPITAL | Age: 46
End: 2021-12-31

## 2021-12-31 ENCOUNTER — APPOINTMENT (OUTPATIENT)
Dept: CT IMAGING | Facility: HOSPITAL | Age: 46
End: 2021-12-31

## 2021-12-31 ENCOUNTER — HOSPITAL ENCOUNTER (EMERGENCY)
Facility: HOSPITAL | Age: 46
Discharge: HOME | End: 2021-12-31
Attending: EMERGENCY MEDICINE | Admitting: EMERGENCY MEDICINE

## 2021-12-31 VITALS
HEIGHT: 63 IN | BODY MASS INDEX: 44.3 KG/M2 | SYSTOLIC BLOOD PRESSURE: 140 MMHG | WEIGHT: 250 LBS | OXYGEN SATURATION: 100 % | HEART RATE: 60 BPM | DIASTOLIC BLOOD PRESSURE: 81 MMHG | TEMPERATURE: 98.7 F | RESPIRATION RATE: 18 BRPM

## 2021-12-31 DIAGNOSIS — R56.9 NEW ONSET SEIZURE: Primary | ICD-10-CM

## 2021-12-31 LAB
ALBUMIN SERPL-MCNC: 4.2 G/DL (ref 3.5–5.2)
ALBUMIN/GLOB SERPL: 1.3 G/DL
ALP SERPL-CCNC: 92 U/L (ref 39–117)
ALT SERPL W P-5'-P-CCNC: 9 U/L (ref 1–33)
AMPHET+METHAMPHET UR QL: NEGATIVE
AMPHETAMINES UR QL: NEGATIVE
ANION GAP SERPL CALCULATED.3IONS-SCNC: 10 MMOL/L (ref 5–15)
AST SERPL-CCNC: 17 U/L (ref 1–32)
B-HCG UR QL: NEGATIVE
BARBITURATES UR QL SCN: NEGATIVE
BASOPHILS # BLD AUTO: 0.02 10*3/MM3 (ref 0–0.2)
BASOPHILS NFR BLD AUTO: 0.4 % (ref 0–1.5)
BENZODIAZ UR QL SCN: NEGATIVE
BILIRUB SERPL-MCNC: 0.4 MG/DL (ref 0–1.2)
BILIRUB UR QL STRIP: NEGATIVE
BUN SERPL-MCNC: 9 MG/DL (ref 6–20)
BUN/CREAT SERPL: 14.5 (ref 7–25)
BUPRENORPHINE SERPL-MCNC: NEGATIVE NG/ML
CALCIUM SPEC-SCNC: 9.1 MG/DL (ref 8.6–10.5)
CANNABINOIDS SERPL QL: NEGATIVE
CHLORIDE SERPL-SCNC: 103 MMOL/L (ref 98–107)
CK SERPL-CCNC: 139 U/L (ref 20–180)
CLARITY UR: CLEAR
CO2 SERPL-SCNC: 25 MMOL/L (ref 22–29)
COCAINE UR QL: NEGATIVE
COLOR UR: YELLOW
CREAT SERPL-MCNC: 0.62 MG/DL (ref 0.57–1)
DEPRECATED RDW RBC AUTO: 39 FL (ref 37–54)
EOSINOPHIL # BLD AUTO: 0.05 10*3/MM3 (ref 0–0.4)
EOSINOPHIL NFR BLD AUTO: 1 % (ref 0.3–6.2)
ERYTHROCYTE [DISTWIDTH] IN BLOOD BY AUTOMATED COUNT: 13.5 % (ref 12.3–15.4)
ETHANOL BLD-MCNC: <10 MG/DL (ref 0–10)
GFR SERPL CREATININE-BSD FRML MDRD: 104 ML/MIN/1.73
GFR SERPL CREATININE-BSD FRML MDRD: 126 ML/MIN/1.73
GLOBULIN UR ELPH-MCNC: 3.2 GM/DL
GLUCOSE SERPL-MCNC: 105 MG/DL (ref 65–99)
GLUCOSE UR STRIP-MCNC: NEGATIVE MG/DL
HCT VFR BLD AUTO: 42.4 % (ref 34–46.6)
HGB BLD-MCNC: 13.5 G/DL (ref 12–15.9)
HGB UR QL STRIP.AUTO: NEGATIVE
HOLD SPECIMEN: NORMAL
IMM GRANULOCYTES # BLD AUTO: 0.01 10*3/MM3 (ref 0–0.05)
IMM GRANULOCYTES NFR BLD AUTO: 0.2 % (ref 0–0.5)
KETONES UR QL STRIP: NEGATIVE
LEUKOCYTE ESTERASE UR QL STRIP.AUTO: NEGATIVE
LYMPHOCYTES # BLD AUTO: 1.11 10*3/MM3 (ref 0.7–3.1)
LYMPHOCYTES NFR BLD AUTO: 22 % (ref 19.6–45.3)
MAGNESIUM SERPL-MCNC: 1.9 MG/DL (ref 1.6–2.6)
MCH RBC QN AUTO: 25.4 PG (ref 26.6–33)
MCHC RBC AUTO-ENTMCNC: 31.8 G/DL (ref 31.5–35.7)
MCV RBC AUTO: 79.8 FL (ref 79–97)
METHADONE UR QL SCN: NEGATIVE
MONOCYTES # BLD AUTO: 0.3 10*3/MM3 (ref 0.1–0.9)
MONOCYTES NFR BLD AUTO: 5.9 % (ref 5–12)
MYOGLOBIN SERPL-MCNC: 21 NG/ML (ref 25–58)
NEUTROPHILS NFR BLD AUTO: 3.56 10*3/MM3 (ref 1.7–7)
NEUTROPHILS NFR BLD AUTO: 70.5 % (ref 42.7–76)
NITRITE UR QL STRIP: NEGATIVE
NRBC BLD AUTO-RTO: 0 /100 WBC (ref 0–0.2)
OPIATES UR QL: NEGATIVE
OVALOCYTES BLD QL SMEAR: NORMAL
OXYCODONE UR QL SCN: NEGATIVE
PCP UR QL SCN: NEGATIVE
PH UR STRIP.AUTO: 8 [PH] (ref 5–8)
PLAT MORPH BLD: NORMAL
PLATELET # BLD AUTO: 360 10*3/MM3 (ref 140–450)
PMV BLD AUTO: 9.6 FL (ref 6–12)
POTASSIUM SERPL-SCNC: 4.4 MMOL/L (ref 3.5–5.2)
PROPOXYPH UR QL: NEGATIVE
PROT SERPL-MCNC: 7.4 G/DL (ref 6–8.5)
PROT UR QL STRIP: NEGATIVE
QT INTERVAL: 442 MS
QTC INTERVAL: 426 MS
RBC # BLD AUTO: 5.31 10*6/MM3 (ref 3.77–5.28)
SODIUM SERPL-SCNC: 138 MMOL/L (ref 136–145)
SP GR UR STRIP: 1.01 (ref 1–1.03)
TRICYCLICS UR QL SCN: NEGATIVE
TROPONIN T SERPL-MCNC: <0.01 NG/ML (ref 0–0.03)
UROBILINOGEN UR QL STRIP: NORMAL
WBC MORPH BLD: NORMAL
WBC NRBC COR # BLD: 5.05 10*3/MM3 (ref 3.4–10.8)
WHOLE BLOOD HOLD SPECIMEN: NORMAL
WHOLE BLOOD HOLD SPECIMEN: NORMAL

## 2021-12-31 PROCEDURE — 82077 ASSAY SPEC XCP UR&BREATH IA: CPT | Performed by: EMERGENCY MEDICINE

## 2021-12-31 PROCEDURE — 71045 X-RAY EXAM CHEST 1 VIEW: CPT

## 2021-12-31 PROCEDURE — 0 LEVETIRACETAM IN NACL 0.75% 1000 MG/100ML SOLUTION: Performed by: EMERGENCY MEDICINE

## 2021-12-31 PROCEDURE — 80306 DRUG TEST PRSMV INSTRMNT: CPT | Performed by: EMERGENCY MEDICINE

## 2021-12-31 PROCEDURE — 85007 BL SMEAR W/DIFF WBC COUNT: CPT

## 2021-12-31 PROCEDURE — 80053 COMPREHEN METABOLIC PANEL: CPT

## 2021-12-31 PROCEDURE — 85025 COMPLETE CBC W/AUTO DIFF WBC: CPT

## 2021-12-31 PROCEDURE — 82550 ASSAY OF CK (CPK): CPT | Performed by: EMERGENCY MEDICINE

## 2021-12-31 PROCEDURE — 81003 URINALYSIS AUTO W/O SCOPE: CPT | Performed by: EMERGENCY MEDICINE

## 2021-12-31 PROCEDURE — 81025 URINE PREGNANCY TEST: CPT | Performed by: EMERGENCY MEDICINE

## 2021-12-31 PROCEDURE — 70450 CT HEAD/BRAIN W/O DYE: CPT

## 2021-12-31 PROCEDURE — 93005 ELECTROCARDIOGRAM TRACING: CPT

## 2021-12-31 PROCEDURE — 83874 ASSAY OF MYOGLOBIN: CPT | Performed by: EMERGENCY MEDICINE

## 2021-12-31 PROCEDURE — 84484 ASSAY OF TROPONIN QUANT: CPT

## 2021-12-31 PROCEDURE — 83735 ASSAY OF MAGNESIUM: CPT

## 2021-12-31 PROCEDURE — 99284 EMERGENCY DEPT VISIT MOD MDM: CPT

## 2021-12-31 PROCEDURE — 96374 THER/PROPH/DIAG INJ IV PUSH: CPT

## 2021-12-31 RX ORDER — LEVETIRACETAM 500 MG/1
500 TABLET ORAL 2 TIMES DAILY
Qty: 60 TABLET | Refills: 0 | Status: SHIPPED | OUTPATIENT
Start: 2021-12-31 | End: 2022-02-24

## 2021-12-31 RX ORDER — LEVETIRACETAM 10 MG/ML
1000 INJECTION INTRAVASCULAR ONCE
Status: COMPLETED | OUTPATIENT
Start: 2021-12-31 | End: 2021-12-31

## 2021-12-31 RX ORDER — SODIUM CHLORIDE 0.9 % (FLUSH) 0.9 %
10 SYRINGE (ML) INJECTION AS NEEDED
Status: DISCONTINUED | OUTPATIENT
Start: 2021-12-31 | End: 2021-12-31 | Stop reason: HOSPADM

## 2021-12-31 RX ADMIN — LEVETIRACETAM 1000 MG: 10 INJECTION INTRAVASCULAR at 13:49

## 2022-02-24 ENCOUNTER — OFFICE VISIT (OUTPATIENT)
Dept: NEUROLOGY | Facility: CLINIC | Age: 47
End: 2022-02-24

## 2022-02-24 VITALS
DIASTOLIC BLOOD PRESSURE: 80 MMHG | HEIGHT: 63 IN | SYSTOLIC BLOOD PRESSURE: 118 MMHG | BODY MASS INDEX: 44.12 KG/M2 | HEART RATE: 58 BPM | OXYGEN SATURATION: 100 % | WEIGHT: 249 LBS

## 2022-02-24 DIAGNOSIS — F41.9 ANXIETY: ICD-10-CM

## 2022-02-24 DIAGNOSIS — F44.9 CONVERSION DISORDER: ICD-10-CM

## 2022-02-24 DIAGNOSIS — R56.9 NEW ONSET SEIZURE: Primary | ICD-10-CM

## 2022-02-24 PROCEDURE — 99204 OFFICE O/P NEW MOD 45 MIN: CPT | Performed by: PSYCHIATRY & NEUROLOGY
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
## 2022-03-24 ENCOUNTER — HOSPITAL ENCOUNTER (OUTPATIENT)
Dept: NEUROLOGY | Facility: HOSPITAL | Age: 47
Discharge: HOME | End: 2022-03-24

## 2022-03-24 ENCOUNTER — HOSPITAL ENCOUNTER (OUTPATIENT)
Dept: MRI IMAGING | Facility: HOSPITAL | Age: 47
Discharge: HOME | End: 2022-03-24

## 2022-03-24 DIAGNOSIS — R56.9 NEW ONSET SEIZURE: ICD-10-CM

## 2022-03-24 PROCEDURE — 70551 MRI BRAIN STEM W/O DYE: CPT

## 2022-03-24 PROCEDURE — 95812 EEG 41-60 MINUTES: CPT | Performed by: PSYCHIATRY & NEUROLOGY

## 2022-03-24 PROCEDURE — 95812 EEG 41-60 MINUTES: CPT

## 2022-04-29 ENCOUNTER — OFFICE VISIT (OUTPATIENT)
Dept: INTERNAL MEDICINE | Facility: CLINIC | Age: 47
End: 2022-04-29

## 2022-04-29 VITALS
DIASTOLIC BLOOD PRESSURE: 76 MMHG | BODY MASS INDEX: 42.35 KG/M2 | HEART RATE: 77 BPM | RESPIRATION RATE: 20 BRPM | WEIGHT: 239 LBS | OXYGEN SATURATION: 99 % | HEIGHT: 63 IN | SYSTOLIC BLOOD PRESSURE: 128 MMHG | TEMPERATURE: 98.7 F

## 2022-04-29 DIAGNOSIS — H93.13 TINNITUS OF BOTH EARS: ICD-10-CM

## 2022-04-29 DIAGNOSIS — J30.9 ALLERGIC RHINITIS, UNSPECIFIED SEASONALITY, UNSPECIFIED TRIGGER: Primary | ICD-10-CM

## 2022-04-29 PROCEDURE — 99213 OFFICE O/P EST LOW 20 MIN: CPT | Performed by: INTERNAL MEDICINE

## 2022-04-29 RX ORDER — LORATADINE 10 MG/1
10 TABLET ORAL DAILY
Qty: 30 TABLET | Refills: 5 | Status: SHIPPED | OUTPATIENT
Start: 2022-04-29

## 2022-04-29 RX ORDER — TRIAMCINOLONE ACETONIDE 55 UG/1
2 SPRAY, METERED NASAL DAILY
Qty: 16.5 G | Refills: 11 | Status: SHIPPED | OUTPATIENT
Start: 2022-04-29 | End: 2023-04-29

## 2022-04-29 NOTE — PROGRESS NOTES
Office Note      Date: 2022  Patient Name: Lilia Pritchett  MRN: [id number]  : 1975    Chief Complaint   Patient presents with   • Sore Throat   • Allergies       History of Present Illness: Lilia Pritchett is a 47 y.o. female who presents for Sore Throat and Allergies.  Woke up this morning with a sore throat and stuffy nose.  No fever or shortness of breath.  No lymphadenopathy.  Would like referral to ear nose and throat for tinnitus.  Also would like referral to allergy.  Has not tried thing for symptoms.    Subjective      Review of Systems:   Pertinent review of systems per HPI.    Review of Systems   Constitutional: Negative for activity change, appetite change, chills, diaphoresis, fatigue, fever and unexpected weight change.   HENT: Positive for postnasal drip and sore throat. Negative for congestion, dental problem, drooling, ear discharge, ear pain, facial swelling, hearing loss and mouth sores.    Eyes: Negative for pain, discharge and itching.   Respiratory: Negative for apnea, cough, choking, chest tightness and shortness of breath.    Cardiovascular: Negative for chest pain, palpitations and leg swelling.   Gastrointestinal: Negative for abdominal distention, abdominal pain, blood in stool, constipation and diarrhea.   Endocrine: Negative for cold intolerance, heat intolerance, polydipsia and polyuria.   Genitourinary: Negative for difficulty urinating, dysuria, frequency and hematuria.   Skin: Negative for color change, pallor, rash and wound.   Allergic/Immunologic: Negative for environmental allergies, food allergies and immunocompromised state.   Neurological: Negative for dizziness, weakness and light-headedness.   Psychiatric/Behavioral: Negative for agitation, behavioral problems, confusion, decreased concentration and self-injury. The patient is not nervous/anxious.    All other systems reviewed and are negative.    Allergies   Allergen Reactions   • Bactrim  "[Sulfamethoxazole-Trimethoprim] Swelling   • Latex Other (See Comments)   • Other Other (See Comments)   • Peanut Oil Other (See Comments)       Objective     Physical Exam:  Vital Signs:   Vitals:    04/29/22 0934   BP: 128/76   Pulse: 77   Resp: 20   Temp: 98.7 °F (37.1 °C)   TempSrc: Temporal   SpO2: 99%   Weight: 108 kg (239 lb)   Height: 160 cm (63\")      Body mass index is 42.34 kg/m².    Physical Exam  Vitals and nursing note reviewed.   Constitutional:       General: She is not in acute distress.     Appearance: She is well-developed.   HENT:      Head: Normocephalic and atraumatic.      Right Ear: External ear normal.      Left Ear: External ear normal.      Mouth/Throat:      Mouth: Mucous membranes are moist.      Pharynx: Oropharynx is clear. No oropharyngeal exudate or posterior oropharyngeal erythema.   Eyes:      General: No scleral icterus.        Right eye: No discharge.         Left eye: No discharge.      Conjunctiva/sclera: Conjunctivae normal.   Cardiovascular:      Rate and Rhythm: Normal rate and regular rhythm.      Heart sounds: Normal heart sounds. No murmur heard.    No friction rub. No gallop.   Pulmonary:      Effort: Pulmonary effort is normal. No respiratory distress.      Breath sounds: Normal breath sounds. No wheezing or rales.   Skin:     General: Skin is warm and dry.      Coloration: Skin is not pale.         Assessment / Plan      Assessment & Plan:    1. Allergic rhinitis, unspecified seasonality, unspecified trigger  Acute onset symptoms.  Most likely related to allergies.  Rx for below  - Triamcinolone Acetonide (Nasacort Allergy 24HR) 55 MCG/ACT nasal inhaler; 2 sprays into the nostril(s) as directed by provider Daily.  Dispense: 16.5 g; Refill: 11  - loratadine (CLARITIN) 10 MG tablet; Take 1 tablet by mouth Daily.  Dispense: 30 tablet; Refill: 5  - Ambulatory Referral to Allergy    2. Tinnitus of both ears   Ambulatory Referral to ENT (Otolaryngology)      Sravanthi Liu, " MD  04/29/2022

## 2022-05-24 ENCOUNTER — OFFICE VISIT (OUTPATIENT)
Dept: NEUROLOGY | Facility: CLINIC | Age: 47
End: 2022-05-24

## 2022-05-24 VITALS
HEART RATE: 61 BPM | WEIGHT: 237.4 LBS | SYSTOLIC BLOOD PRESSURE: 130 MMHG | OXYGEN SATURATION: 99 % | DIASTOLIC BLOOD PRESSURE: 80 MMHG | RESPIRATION RATE: 18 BRPM | HEIGHT: 63 IN | BODY MASS INDEX: 42.06 KG/M2

## 2022-05-24 DIAGNOSIS — R56.9 NEW ONSET SEIZURE: Primary | ICD-10-CM

## 2022-05-24 PROCEDURE — 99213 OFFICE O/P EST LOW 20 MIN: CPT | Performed by: PSYCHIATRY & NEUROLOGY
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
## 2022-07-07 ENCOUNTER — TELEPHONE (OUTPATIENT)
Dept: NEUROLOGY | Facility: CLINIC | Age: 47
End: 2022-07-07

## 2022-07-07 NOTE — TELEPHONE ENCOUNTER
Can you please type up a letter stating-    Patient had a seizure-like episode in February which was felt to be a nonepileptic spell.  Further testing including MRI brain and EEG were unremarkable.  Patient is now cleared for driving.

## 2022-07-07 NOTE — TELEPHONE ENCOUNTER
Provider: GILBERT  Caller: PATIENT  Relationship to Patient: SELF  Pharmacy: N/A  Phone Number: [phone]  Reason for Call: PATIENT TELEPHONED; IS NEEDING A STATEMENT SAYING PATIENT IS CLEARED TO DRIVE; PATIENT DRIVES FOR A LIVING.    PLEASE CALL WITH ANY QUESTIONS.    THANK YOU.

## 2022-07-08 NOTE — TELEPHONE ENCOUNTER
TOMM for Pt that letter is typed and I will be sending a copy out today. I asked that she call back if it needs to go somewhere other than the address on file.

## 2022-10-18 ENCOUNTER — OFFICE VISIT (OUTPATIENT)
Dept: INTERNAL MEDICINE | Facility: CLINIC | Age: 47
End: 2022-10-18

## 2022-10-18 VITALS
SYSTOLIC BLOOD PRESSURE: 124 MMHG | TEMPERATURE: 98.1 F | WEIGHT: 208 LBS | HEIGHT: 63 IN | DIASTOLIC BLOOD PRESSURE: 78 MMHG | BODY MASS INDEX: 36.86 KG/M2 | HEART RATE: 54 BPM | OXYGEN SATURATION: 98 % | RESPIRATION RATE: 16 BRPM

## 2022-10-18 DIAGNOSIS — R21 RASH: Primary | ICD-10-CM

## 2022-10-18 DIAGNOSIS — I83.12 VARICOSE VEINS OF BOTH LOWER EXTREMITIES WITH INFLAMMATION: ICD-10-CM

## 2022-10-18 DIAGNOSIS — M79.89 LEFT LEG SWELLING: ICD-10-CM

## 2022-10-18 DIAGNOSIS — I83.11 VARICOSE VEINS OF BOTH LOWER EXTREMITIES WITH INFLAMMATION: ICD-10-CM

## 2022-10-18 DIAGNOSIS — L98.9 SKIN SORE: ICD-10-CM

## 2022-10-18 DIAGNOSIS — M79.605 LEFT LEG PAIN: ICD-10-CM

## 2022-10-18 PROCEDURE — 99214 OFFICE O/P EST MOD 30 MIN: CPT | Performed by: NURSE PRACTITIONER

## 2022-10-18 PROCEDURE — 96372 THER/PROPH/DIAG INJ SC/IM: CPT | Performed by: NURSE PRACTITIONER

## 2022-10-18 RX ORDER — CEPHALEXIN 500 MG/1
500 CAPSULE ORAL 3 TIMES DAILY
Qty: 30 CAPSULE | Refills: 0 | Status: SHIPPED | OUTPATIENT
Start: 2022-10-18 | End: 2022-10-28

## 2022-10-18 RX ORDER — FLUCONAZOLE 150 MG/1
TABLET ORAL
Qty: 2 TABLET | Refills: 0 | Status: SHIPPED | OUTPATIENT
Start: 2022-10-18

## 2022-10-18 RX ORDER — TRIAMCINOLONE ACETONIDE 1 MG/G
1 CREAM TOPICAL 2 TIMES DAILY PRN
Qty: 453 G | Refills: 1 | Status: SHIPPED | OUTPATIENT
Start: 2022-10-18

## 2022-10-18 RX ORDER — TRIAMCINOLONE ACETONIDE 40 MG/ML
40 INJECTION, SUSPENSION INTRA-ARTICULAR; INTRAMUSCULAR ONCE
Status: COMPLETED | OUTPATIENT
Start: 2022-10-18 | End: 2022-10-18

## 2022-10-18 RX ADMIN — TRIAMCINOLONE ACETONIDE 40 MG: 40 INJECTION, SUSPENSION INTRA-ARTICULAR; INTRAMUSCULAR at 09:37
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
## 2022-11-10 ENCOUNTER — APPOINTMENT (OUTPATIENT)
Dept: CT IMAGING | Facility: HOSPITAL | Age: 47
End: 2022-11-10

## 2022-11-10 ENCOUNTER — HOSPITAL ENCOUNTER (EMERGENCY)
Facility: HOSPITAL | Age: 47
Discharge: HOME | End: 2022-11-10
Attending: EMERGENCY MEDICINE | Admitting: EMERGENCY MEDICINE

## 2022-11-10 VITALS
RESPIRATION RATE: 20 BRPM | SYSTOLIC BLOOD PRESSURE: 113 MMHG | WEIGHT: 209 LBS | DIASTOLIC BLOOD PRESSURE: 81 MMHG | TEMPERATURE: 98.7 F | HEIGHT: 63 IN | HEART RATE: 62 BPM | BODY MASS INDEX: 37.03 KG/M2 | OXYGEN SATURATION: 100 %

## 2022-11-10 DIAGNOSIS — S13.4XXA WHIPLASH INJURY SYNDROME, INITIAL ENCOUNTER: ICD-10-CM

## 2022-11-10 DIAGNOSIS — S16.1XXA ACUTE CERVICAL MYOFASCIAL STRAIN, INITIAL ENCOUNTER: Primary | ICD-10-CM

## 2022-11-10 PROCEDURE — 99284 EMERGENCY DEPT VISIT MOD MDM: CPT

## 2022-11-10 PROCEDURE — 70450 CT HEAD/BRAIN W/O DYE: CPT

## 2022-11-10 PROCEDURE — 72125 CT NECK SPINE W/O DYE: CPT

## 2022-11-10 RX ORDER — IBUPROFEN 600 MG/1
600 TABLET ORAL EVERY 6 HOURS PRN
Qty: 40 TABLET | Refills: 0 | Status: SHIPPED | OUTPATIENT
Start: 2022-11-10

## 2022-11-10 RX ORDER — LIDOCAINE 50 MG/G
1 PATCH TOPICAL EVERY 24 HOURS
Qty: 30 PATCH | Refills: 0 | Status: SHIPPED | OUTPATIENT
Start: 2022-11-10

## 2022-11-10 RX ORDER — BUTALBITAL, ACETAMINOPHEN AND CAFFEINE 50; 325; 40 MG/1; MG/1; MG/1
2 TABLET ORAL ONCE
Status: COMPLETED | OUTPATIENT
Start: 2022-11-10 | End: 2022-11-10

## 2022-11-10 RX ORDER — TIZANIDINE 4 MG/1
4 TABLET ORAL EVERY 6 HOURS PRN
Qty: 20 TABLET | Refills: 0 | Status: SHIPPED | OUTPATIENT
Start: 2022-11-10

## 2022-11-10 RX ADMIN — BUTALBITAL, ACETAMINOPHEN, AND CAFFEINE 2 TABLET: 50; 325; 40 TABLET ORAL at 19:29
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
## 2022-11-11 NOTE — DISCHARGE INSTRUCTIONS
Cold compresses tonight and tomorrow every few hours for 20 to 30 minutes, then switch to warm compresses the next day in a similar fashion.  Recheck in 3 to 5 days if not significantly improved, have a low threshold to return if any change or worsening of symptoms.

## 2022-11-17 ENCOUNTER — HOSPITAL ENCOUNTER (OUTPATIENT)
Dept: CARDIOLOGY | Facility: HOSPITAL | Age: 47
Discharge: HOME | End: 2022-11-17

## 2022-11-17 VITALS — WEIGHT: 209 LBS | HEIGHT: 63 IN | BODY MASS INDEX: 37.03 KG/M2

## 2022-11-17 DIAGNOSIS — L98.9 SKIN SORE: ICD-10-CM

## 2022-11-17 DIAGNOSIS — M79.605 LEFT LEG PAIN: ICD-10-CM

## 2022-11-17 DIAGNOSIS — I83.12 VARICOSE VEINS OF BOTH LOWER EXTREMITIES WITH INFLAMMATION: ICD-10-CM

## 2022-11-17 DIAGNOSIS — I83.11 VARICOSE VEINS OF BOTH LOWER EXTREMITIES WITH INFLAMMATION: ICD-10-CM

## 2022-11-17 DIAGNOSIS — M79.89 LEFT LEG SWELLING: ICD-10-CM

## 2022-11-17 LAB
BH CV LEFT LOWER VAS AA GSV REFLUX TIME: 0 SEC
BH CV LEFT LOWER VAS AA GSV TRANS DIAMETER: 0.27 CM
BH CV LEFT LOWER VAS COMMON FEMORAL REFLUX TIME: 1.6 SEC
BH CV LEFT LOWER VAS GSV BELOW KNEE REFLUX TIME: 6.4 SEC
BH CV LEFT LOWER VAS GSV KNEE REFLUX TIME: 0 SEC
BH CV LEFT LOWER VAS GSV KNEE TRANSVERSE DIAMETER: 0.2 CM
BH CV LEFT LOWER VAS GSV MID CALF REFLUX TIME: 4.6 SEC
BH CV LEFT LOWER VAS GSV MID CALF TRANS DIAMETER: 0.1 CM
BH CV LEFT LOWER VAS GSV PROX REFLUX TIME: 0 SEC
BH CV LEFT LOWER VAS GSV PROX TRANSVERSE DIAMETER: 0.2 CM
BH CV LEFT LOWER VAS GSVBELOW KNEE TRANSVERSE DIAMETER: 0.2 CM
BH CV LEFT LOWER VAS SAPHENOFEMORAL JUNCTION REFLUX TIME: 0 SEC
BH CV LEFT LOWER VAS SAPHENOFEMORAL JUNCTION TRANSVERSE DIAMETER: 0.3 CM
BH CV LEFT LOWER VAS SPJ REFLUX TIME: 0 SEC
BH CV LEFT LOWER VAS SPJ TRANS DIAMETER: 0.2 CM
BH CV LEFT LOWER VAS SSV MID CALF REFLUX TIME: 0 SEC
BH CV LEFT LOWER VAS SSV MID CALF TRANS DIAMETER: 0.1 CM
BH CV LOW VAS LEFT COMMON FEM NOT VIS SCRIPTING: 1
BH CV LOW VAS LEFT GREATER SAPH BK VESSEL: 1
BH CV LOW VAS LEFT GSV MID CALF VESSEL: 1
BH CV LOW VAS RIGHT GSV MID CALF VESSEL: 1
BH CV LOW VAS RIGHT VARICOSITY AK VESSEL: 1
BH CV LOW VAS RIGHT VARICOSITY BK VESSEL: 1
BH CV LOWER ARTERIAL LEFT ABI RATIO: 1.07
BH CV LOWER ARTERIAL LEFT DORSALIS PEDIS SYS MAX: 118
BH CV LOWER ARTERIAL LEFT GREAT TOE SYS MAX: 78
BH CV LOWER ARTERIAL LEFT POST TIBIAL SYS MAX: 120
BH CV LOWER ARTERIAL LEFT TBI RATIO: 0.7
BH CV LOWER ARTERIAL RIGHT ABI RATIO: 1.18
BH CV LOWER ARTERIAL RIGHT DORSALIS PEDIS SYS MAX: 132
BH CV LOWER ARTERIAL RIGHT GREAT TOE SYS MAX: 98
BH CV LOWER ARTERIAL RIGHT POST TIBIAL SYS MAX: 123
BH CV LOWER ARTERIAL RIGHT TBI RATIO: 0.88
BH CV LOWER VAS LEFT GSV DIST THIGH COMPRESSIBILTY: NORMAL
BH CV LOWER VAS LEFT GSV MID CALF COMPRESSIBILTY: NORMAL
BH CV LOWER VAS RIGHT GSV DIST THIGH COMPRESSIBILTY: NORMAL
BH CV LOWER VAS RIGHT GSV MID CALF COMPRESSIBILTY: NORMAL
BH CV LOWER VASCULAR LEFT AA GSV COMPETENT: NORMAL
BH CV LOWER VASCULAR LEFT AA GSV COMPRESS: NORMAL
BH CV LOWER VASCULAR LEFT COMMON FEMORAL AUGMENT: NORMAL
BH CV LOWER VASCULAR LEFT COMMON FEMORAL COMPETENT: NORMAL
BH CV LOWER VASCULAR LEFT COMMON FEMORAL COMPRESS: NORMAL
BH CV LOWER VASCULAR LEFT COMMON FEMORAL PHASIC: NORMAL
BH CV LOWER VASCULAR LEFT COMMON FEMORAL SPONT: NORMAL
BH CV LOWER VASCULAR LEFT DISTAL FEMORAL AUGMENT: NORMAL
BH CV LOWER VASCULAR LEFT DISTAL FEMORAL COMPETENT: NORMAL
BH CV LOWER VASCULAR LEFT DISTAL FEMORAL COMPRESS: NORMAL
BH CV LOWER VASCULAR LEFT DISTAL FEMORAL PHASIC: NORMAL
BH CV LOWER VASCULAR LEFT DISTAL FEMORAL SPONT: NORMAL
BH CV LOWER VASCULAR LEFT GREATER SAPH AK COMPETENT: NORMAL
BH CV LOWER VASCULAR LEFT GREATER SAPH AK COMPRESS: NORMAL
BH CV LOWER VASCULAR LEFT GREATER SAPH BK COMPETENT: NORMAL
BH CV LOWER VASCULAR LEFT GREATER SAPH BK COMPRESS: NORMAL
BH CV LOWER VASCULAR LEFT GSV DIST THIGH COMPETENT: NORMAL
BH CV LOWER VASCULAR LEFT GSV MID CALF COMPETENT: NORMAL
BH CV LOWER VASCULAR LEFT MID FEMORAL AUGMENT: NORMAL
BH CV LOWER VASCULAR LEFT MID FEMORAL COMPETENT: NORMAL
BH CV LOWER VASCULAR LEFT MID FEMORAL COMPRESS: NORMAL
BH CV LOWER VASCULAR LEFT MID FEMORAL PHASIC: NORMAL
BH CV LOWER VASCULAR LEFT MID FEMORAL SPONT: NORMAL
BH CV LOWER VASCULAR LEFT PERONEAL COMPRESS: NORMAL
BH CV LOWER VASCULAR LEFT POPLITEAL AUGMENT: NORMAL
BH CV LOWER VASCULAR LEFT POPLITEAL COMPETENT: NORMAL
BH CV LOWER VASCULAR LEFT POPLITEAL COMPRESS: NORMAL
BH CV LOWER VASCULAR LEFT POPLITEAL PHASIC: NORMAL
BH CV LOWER VASCULAR LEFT POPLITEAL SPONT: NORMAL
BH CV LOWER VASCULAR LEFT POSTERIOR TIBIAL COMPRESS: NORMAL
BH CV LOWER VASCULAR LEFT PROXIMAL FEMORAL AUGMENT: NORMAL
BH CV LOWER VASCULAR LEFT PROXIMAL FEMORAL COMPRESS: NORMAL
BH CV LOWER VASCULAR LEFT PROXIMAL FEMORAL PHASIC: NORMAL
BH CV LOWER VASCULAR LEFT PROXIMAL FEMORAL SPONT: NORMAL
BH CV LOWER VASCULAR LEFT SAPHENOPOP JX AUGMENT: NORMAL
BH CV LOWER VASCULAR LEFT SAPHENOPOP JX COMPETENT: NORMAL
BH CV LOWER VASCULAR LEFT SAPHENOPOP JX COMPRESS: NORMAL
BH CV LOWER VASCULAR LEFT SAPHENOPOP JX PHASIC: NORMAL
BH CV LOWER VASCULAR LEFT SAPHENOPOP JX SPONT: NORMAL
BH CV LOWER VASCULAR LEFT SSV MID CALF COMPETENT: NORMAL
BH CV LOWER VASCULAR LEFT SSV MID CALF COMPRESS: NORMAL
BH CV LOWER VASCULAR RIGHT AA GSV COMPETENT: NORMAL
BH CV LOWER VASCULAR RIGHT AA GSV COMPRESS: NORMAL
BH CV LOWER VASCULAR RIGHT COMMON FEMORAL AUGMENT: NORMAL
BH CV LOWER VASCULAR RIGHT COMMON FEMORAL COMPETENT: NORMAL
BH CV LOWER VASCULAR RIGHT COMMON FEMORAL COMPRESS: NORMAL
BH CV LOWER VASCULAR RIGHT COMMON FEMORAL PHASIC: NORMAL
BH CV LOWER VASCULAR RIGHT COMMON FEMORAL SPONT: NORMAL
BH CV LOWER VASCULAR RIGHT DISTAL FEMORAL AUGMENT: NORMAL
BH CV LOWER VASCULAR RIGHT DISTAL FEMORAL COMPETENT: NORMAL
BH CV LOWER VASCULAR RIGHT DISTAL FEMORAL COMPRESS: NORMAL
BH CV LOWER VASCULAR RIGHT DISTAL FEMORAL PHASIC: NORMAL
BH CV LOWER VASCULAR RIGHT DISTAL FEMORAL SPONT: NORMAL
BH CV LOWER VASCULAR RIGHT GREATER SAPH AK COMPETENT: NORMAL
BH CV LOWER VASCULAR RIGHT GREATER SAPH BK COMPETENT: NORMAL
BH CV LOWER VASCULAR RIGHT GREATER SAPH BK COMPRESS: NORMAL
BH CV LOWER VASCULAR RIGHT GSV DIST THIGH COMPETENT: NORMAL
BH CV LOWER VASCULAR RIGHT GSV MID CALF COMPETENT: NORMAL
BH CV LOWER VASCULAR RIGHT MID FEMORAL AUGMENT: NORMAL
BH CV LOWER VASCULAR RIGHT MID FEMORAL COMPETENT: NORMAL
BH CV LOWER VASCULAR RIGHT MID FEMORAL COMPRESS: NORMAL
BH CV LOWER VASCULAR RIGHT MID FEMORAL PHASIC: NORMAL
BH CV LOWER VASCULAR RIGHT MID FEMORAL SPONT: NORMAL
BH CV LOWER VASCULAR RIGHT PERONEAL COMPRESS: NORMAL
BH CV LOWER VASCULAR RIGHT POPLITEAL AUGMENT: NORMAL
BH CV LOWER VASCULAR RIGHT POPLITEAL COMPETENT: NORMAL
BH CV LOWER VASCULAR RIGHT POPLITEAL COMPRESS: NORMAL
BH CV LOWER VASCULAR RIGHT POPLITEAL PHASIC: NORMAL
BH CV LOWER VASCULAR RIGHT POPLITEAL SPONT: NORMAL
BH CV LOWER VASCULAR RIGHT POSTERIOR TIBIAL COMPRESS: NORMAL
BH CV LOWER VASCULAR RIGHT PROXIMAL FEMORAL AUGMENT: NORMAL
BH CV LOWER VASCULAR RIGHT PROXIMAL FEMORAL COMPETENT: NORMAL
BH CV LOWER VASCULAR RIGHT PROXIMAL FEMORAL COMPRESS: NORMAL
BH CV LOWER VASCULAR RIGHT PROXIMAL FEMORAL PHASIC: NORMAL
BH CV LOWER VASCULAR RIGHT PROXIMAL FEMORAL SPONT: NORMAL
BH CV LOWER VASCULAR RIGHT SAPHENOFEMORAL JUNCTION AUGMENT: NORMAL
BH CV LOWER VASCULAR RIGHT SAPHENOFEMORAL JUNCTION COMPETENT: NORMAL
BH CV LOWER VASCULAR RIGHT SAPHENOFEMORAL JUNCTION COMPRESS: NORMAL
BH CV LOWER VASCULAR RIGHT SAPHENOFEMORAL JUNCTION PHASIC: NORMAL
BH CV LOWER VASCULAR RIGHT SAPHENOFEMORAL JUNCTION SPONT: NORMAL
BH CV LOWER VASCULAR RIGHT SAPHENOPOP JX AUGMENT: NORMAL
BH CV LOWER VASCULAR RIGHT SAPHENOPOP JX COMPETENT: NORMAL
BH CV LOWER VASCULAR RIGHT SAPHENOPOP JX COMPRESS: NORMAL
BH CV LOWER VASCULAR RIGHT SAPHENOPOP JX PHASIC: NORMAL
BH CV LOWER VASCULAR RIGHT SAPHENOPOP JX SPONT: NORMAL
BH CV LOWER VASCULAR RIGHT SSV MID CALF COMPETENT: NORMAL
BH CV LOWER VASCULAR RIGHT SSV MID CALF COMPRESS: NORMAL
BH CV LOWER VASCULAR RIGHT VARICOSITY AK COMPETENT: NORMAL
BH CV LOWER VASCULAR RIGHT VARICOSITY AK COMPRESS: NORMAL
BH CV LOWER VASCULAR RIGHT VARICOSITY BK COMPETENT: NORMAL
BH CV LOWER VASCULAR RIGHT VARICOSITY BK COMPRESS: NORMAL
BH CV RIGHT LOWER VAS AA GSV REFLUX TIME: 0 SEC
BH CV RIGHT LOWER VAS AA GSV TRANS DIAMETER: 0.3 CM
BH CV RIGHT LOWER VAS GSV KNEE REFLUX TIME: 0 SEC
BH CV RIGHT LOWER VAS GSV KNEE TRANS DIAMETER: 0.24 CM
BH CV RIGHT LOWER VAS GSV MID CALF REFLUX TIME: 6 SEC
BH CV RIGHT LOWER VAS GSV MID CALF TRANS DIAMETER: 0.3 CM
BH CV RIGHT LOWER VAS GSV PROX CALF REFLUX TIME: 0 SEC
BH CV RIGHT LOWER VAS GSV PROX CALF TRANS DIAMETER: 0.2 CM
BH CV RIGHT LOWER VAS GSV PROX THIGH REFLUX TIME: 0 SEC
BH CV RIGHT LOWER VAS GSV PROX THIGH TRANS DIAMETER: 0.4 CM
BH CV RIGHT LOWER VAS SAPHENOFEM JUNCTION REFLUX TIME: 0 SEC
BH CV RIGHT LOWER VAS SAPHENOFEM JUNCTION TRANSVERSE DIAMETER: 0.4 CM
BH CV RIGHT LOWER VAS SPJ REFLUX TIME: 0 SEC
BH CV RIGHT LOWER VAS SPJ TRANS DIAMETER: 0.2 CM
BH CV RIGHT LOWER VAS SSV MID CALF REFLUX TIME: 0 SEC
BH CV RIGHT LOWER VAS SSV MID CALF TRANS DIAMETER: 0.2 CM
BH CV RIGHT LOWER VAS VARICOSITY AK REFLUX TIME: 3.4 SEC
BH CV RIGHT LOWER VAS VARICOSITY AK TRANS DIAMETER: 0.29 CM
BH CV RIGHT LOWER VAS VARICOSITY BK TRANS DIAMETER: 0.31 CM
BH CV VAS RIGHT GSV PROXIMAL HIDDEN LRR COMPRESSIBILTY: NORMAL
MAXIMAL PREDICTED HEART RATE: 174 BPM
MAXIMAL PREDICTED HEART RATE: 174 BPM
STRESS TARGET HR: 148 BPM
STRESS TARGET HR: 148 BPM
UPPER ARTERIAL LEFT ARM BRACHIAL SYS MAX: 111 MMHG
UPPER ARTERIAL RIGHT ARM BRACHIAL SYS MAX: 112 MMHG

## 2022-11-17 PROCEDURE — 93970 EXTREMITY STUDY: CPT | Performed by: INTERNAL MEDICINE

## 2022-11-17 PROCEDURE — 93970 EXTREMITY STUDY: CPT

## 2022-11-17 PROCEDURE — 93923 UPR/LXTR ART STDY 3+ LVLS: CPT

## 2022-11-17 PROCEDURE — 93923 UPR/LXTR ART STDY 3+ LVLS: CPT | Performed by: INTERNAL MEDICINE

## 2022-12-09 DIAGNOSIS — I83.12 VARICOSE VEINS OF BOTH LOWER EXTREMITIES WITH INFLAMMATION: Primary | ICD-10-CM

## 2022-12-09 DIAGNOSIS — M79.604 PAIN IN BOTH LOWER EXTREMITIES: ICD-10-CM

## 2022-12-09 DIAGNOSIS — I83.11 VARICOSE VEINS OF BOTH LOWER EXTREMITIES WITH INFLAMMATION: Primary | ICD-10-CM

## 2022-12-09 DIAGNOSIS — M79.89 LEFT LEG SWELLING: ICD-10-CM

## 2022-12-09 DIAGNOSIS — M79.605 LEFT LEG PAIN: ICD-10-CM

## 2022-12-09 DIAGNOSIS — M79.89 LEG SWELLING: ICD-10-CM

## 2022-12-09 DIAGNOSIS — L98.9 SKIN SORE: ICD-10-CM

## 2022-12-09 DIAGNOSIS — M79.605 PAIN IN BOTH LOWER EXTREMITIES: ICD-10-CM

## 2022-12-09 DIAGNOSIS — I99.8 VASCULAR INSUFFICIENCY: ICD-10-CM
